# Patient Record
Sex: MALE | Race: WHITE | Employment: OTHER | URBAN - METROPOLITAN AREA
[De-identification: names, ages, dates, MRNs, and addresses within clinical notes are randomized per-mention and may not be internally consistent; named-entity substitution may affect disease eponyms.]

---

## 2017-01-05 ENCOUNTER — TELEPHONE (OUTPATIENT)
Dept: PALLATIVE CARE | Age: 82
End: 2017-01-05

## 2017-01-05 NOTE — TELEPHONE ENCOUNTER
Patient's son, Alida Ramirez called to advise staff that patient passed away yesterday 1/4/17 due to a massive heart attack. He wanted to thank staff for being so kind and nice to family during his office visits with Dr. Frosty Kayser.

## 2017-06-16 ENCOUNTER — TELEPHONE (OUTPATIENT)
Dept: ONCOLOGY | Age: 82
End: 2017-06-16

## 2017-06-16 NOTE — TELEPHONE ENCOUNTER
Outbound call to [employer], spoke to [place]". Identified self, role and the nature of the call, \"Naila\" acknowledged understanding. Provided representative w/spouse  & address & policy number. Naila reports [employer] will need the following information:  -Pathology Report stating positive for Cancer  -[facility] hospital billing  -copy of death certificate  -documents regarding Radiation, blood transfusions, bone marrow, chemotherapy, Stem Cell or experimental drugs whichever applies to the patient. -operative reports for any surgeries related to Cancer  -MRI/CT Scan  -referral to Oncology  -proof of transportation to/from the hospital via Ambulance   -referral to Hospice; bill from Hospice if it applies    Fax all documents to the following number:   [phone]. Fax must include patient name,  and policy # on all documents [id number].       KIAN Dominguez

## 2017-06-16 NOTE — TELEPHONE ENCOUNTER
2:04 pm. Outbound call placed to spouse, pt identifiers ( & address) verified per HIPAA policy. Identified self, role and nature of the call, spouse acknowledged understanding. Informed spouse of discussion w/[employer] spouse acknowledged understanding. KIAN Cardenas      1:42 pm. Outbound call to Radha Browne., spoke to [employer]". Identified self, role and nature of the call. Pt identifiers and spouse information per HIPAA policy. Provided \"Lilli\" w/the fax# to [facility] [employer]. Writer informed spouse will need to start claim; and give authorization to release information before the official form can be sent over to [facility]. Tony. KIAN Cardenas      Outbound call to [facility] dept. Identified self, role and the nature of the call.  provided the fax# [phone].  further states Magdalena will need to send an official form requesting information out of his medical record. Acknowledged understanding.       KIAN Cardenas

## 2017-06-16 NOTE — TELEPHONE ENCOUNTER
11:00 am, Outbound call to spouse, pt identifiers ( & address) verified per HIPAA policy. Identified self, role and nature of the call. Informed spouse of discussion w/[employer] insurance. Spouse provided ; states her house (address in EMR) is up for sale and she's currently residing w/family at the following address. [address], [place], [address]    KIAN Dominguez      10:41 am, Outbound call to [employer], identified self, role and the nature of the call. Provided pt's name/ per HIPAA policy guidelines. Spoke to [employer]". \"Sheeba\" asked clinician to verify the spouse  & current address. Informed \"Sheeba\" the spouse  isn't listed in the chart; and provided the address in the EMR. Himanshu Robin informs writer \"I can't provide you w/any information b/c you don't have the  of spouse and the address you gave isn't what we have in our system\". KIAN Dominguez      Recv'd staff message from [address] [place] [address], spouse called into the office on 6/15/2017 re: [employer] needs medical documentation for her  who passed away on 2017. Death certificate states \"Cancer\". Outbound call to patient's spouse, pt identifiers verified per HIPAA policy. Identified self, role and nature of the call, spouse acknowledged understanding. Inquired about the nature of the message received, spouse states [employer] needs documentation re: proof of Cancer dx, treatment plan, how often he came to doctor office. Provided the following information:  [phone] Telephone & [phone] Fax. Policy # is [id number] 1 KIAN Tanner